# Patient Record
Sex: FEMALE | URBAN - NONMETROPOLITAN AREA
[De-identification: names, ages, dates, MRNs, and addresses within clinical notes are randomized per-mention and may not be internally consistent; named-entity substitution may affect disease eponyms.]

---

## 2018-03-13 ENCOUNTER — NURSE TRIAGE (OUTPATIENT)
Dept: ADMINISTRATIVE | Age: 73
End: 2018-03-13

## 2019-02-26 ENCOUNTER — NURSE TRIAGE (OUTPATIENT)
Dept: ADMINISTRATIVE | Age: 74
End: 2019-02-26

## 2019-06-03 ENCOUNTER — NURSE TRIAGE (OUTPATIENT)
Dept: OTHER | Facility: CLINIC | Age: 74
End: 2019-06-03

## 2019-06-03 NOTE — TELEPHONE ENCOUNTER
Reason for Disposition   Swelling of both ankles (i.e., pedal edema)   [1] Difficulty breathing with exertion (e.g., walking) AND [2] new onset or worsening    Protocols used: ANKLE SWELLING-ADULT-, LEG SWELLING AND EDEMA-ADULT-    Caller states she has swollen feet / ankles. States she had problems when she was younger with flying, her ankles would swell but would decrease within a day or so. She now has kidney problems - \"not emptying right. \"  She recently took a trip by car, was driving for 3.5 hours and her ankles are swollen, but they are not decreasing in size. She is watching her salt intake. She does state new onset of SOB with exertion, but not at rest.  Recommendation is to proceed to the [address] [place] [address] for further assessment. This triage is a result of a call to [facility]en Nurse. Please do not respond to the triage nurse through this encounter. Any subsequent communication should be directly with the patient.

## 2019-08-26 ENCOUNTER — NURSE TRIAGE (OUTPATIENT)
Dept: OTHER | Facility: CLINIC | Age: 74
End: 2019-08-26

## 2020-07-23 ENCOUNTER — NURSE TRIAGE (OUTPATIENT)
Dept: OTHER | Facility: CLINIC | Age: 75
End: 2020-07-23

## 2020-07-23 NOTE — TELEPHONE ENCOUNTER
This triage is a result of a call to [facility]en Nurse. Please do not respond to the triage nurse through this encounter. Any subsequent communication should be directly with the patient. She is fatigued, gets winded when she exerts herself. This has been going on for a couple of years, but seems to be getting worse. \"I feel like I'm a leaf in a rain storm\". She is speaking in long strong complete sentences. She has been seen for this in the past and all the tests were fine she said. She just feels exhausted. She says she does not sleep well. She states she does have panic at times and some depression. But she said this is not her normal.   She does not have pallor. She states food does not sit well with her at all times. She had a check up in March and is due in August again. She tries to keep busy.      Reason for Disposition   MODERATE weakness (i.e., interferes with work, school, normal activities) and persists > 3 days    Protocols used: WEAKNESS (GENERALIZED) AND FATIGUE-ADULT-[place]

## 2020-12-21 ENCOUNTER — NURSE TRIAGE (OUTPATIENT)
Dept: OTHER | Facility: CLINIC | Age: 75
End: 2020-12-21

## 2020-12-21 NOTE — TELEPHONE ENCOUNTER
Reason for Disposition   Age > 60 years    Answer Assessment - Initial Assessment Questions  1. LOCATION: \"Where does it hurt? \"       Upper abdominal pain     2. RADIATION: \"Does the pain shoot anywhere else? \" (e.g., chest, back)      No     3. ONSET: \"When did the pain begin? \" (e.g., minutes, hours or days ago)       Last night     4. SUDDEN: \"Gradual or sudden onset? \"     Felt fine all day, was nauseous and then pain, vomiting and diarrhea started     5. PATTERN \"Does the pain come and go, or is it constant? \"     - If constant: \"Is it getting better, staying the same, or worsening? \"       (Note: Constant means the pain never goes away completely; most serious pain is constant and it progresses)      - If intermittent: \"How long does it last?\" \"Do you have pain now? \"      (Note: Intermittent means the pain goes away completely between bouts)      intermittenet    6. SEVERITY: \"How bad is the pain? \"  (e.g., Scale 1-10; mild, moderate, or severe)    - MILD (1-3): doesn't interfere with normal activities, abdomen soft and not tender to touch     - MODERATE (4-7): interferes with normal activities or awakens from sleep, tender to touch     - SEVERE (8-10): excruciating pain, doubled over, unable to do any normal activities       3/10    7. RECURRENT SYMPTOM: \"Have you ever had this type of abdominal pain before? \" If so, ask: \"When was the last time? \" and \"What happened that time? \"       Last winter something similar 3 different times,     8. CAUSE: \"What do you think is causing the abdominal pain? \"      Unsure, states she doesn't think it is something she ate     9. RELIEVING/AGGRAVATING FACTORS: \"What makes it better or worse? \" (e.g., movement, antacids, bowel movement)  took 1 tums but hasnt really helped, nothing makes it better or worse     10. OTHER SYMPTOMS: \"Has there been any vomiting, diarrhea, constipation, or urine problems? \"        Vomiting, diarrhea,    11.  PREGNANCY: \"Is there any chance you are pregnant? \" \"When was your last menstrual period? \"        N/a    Protocols used: ABDOMINAL PAIN - FEMALE-ADULT-[place]        Brief description of triage: Pt reports upper abdominal pain that started last night, patient reports vomiting x 2, diarrhea x 1. Triage indicates for patient to be seen today or go to [facility]. Patient agrees. Care advice provided, patient verbalizes understanding; denies any other questions or concerns; instructed to call back for any new or worsening symptoms. Attention Provider: Thank you for allowing me to participate in the care of your patient. The patient was connected to triage in response to information provided to the [facility]. Please do not respond through this encounter as the response is not directed to a shared pool.

## 2021-07-19 ENCOUNTER — NURSE TRIAGE (OUTPATIENT)
Dept: OTHER | Facility: CLINIC | Age: 76
End: 2021-07-19

## 2021-07-19 NOTE — TELEPHONE ENCOUNTER
Brief description of triage:   Patient calling to say that she had called her provider about frequent urination. Has one more day of medication for her UTI. Has no new or worsening symptoms. Patient encouraged to follow providers advice. Care advice provided, patient verbalizes understanding; denies any other questions or concerns; instructed to call back for any new or worsening symptoms. This triage is a result of a call to [facility]en Nurse. Please do not respond to the triage nurse through this encounter. Any subsequent communication should be directly with the patient. Reason for Disposition   Caller has already spoken to PCP or another triager  Ez Alonso has already spoken with the PCP and has no further questions. Answer Assessment - Initial Assessment Questions  1. REASON FOR CALL or QUESTION: \"What is your reason for calling today? \" or \"How can I best help you? \" or \"What question do you have that I can help answer? \"      See above note    Protocols used: INFORMATION ONLY CALL - NO TRIAGE-ADULT-, NO CONTACT OR DUPLICATE CONTACT CALL-ADULT-

## 2021-12-12 ENCOUNTER — NURSE TRIAGE (OUTPATIENT)
Dept: OTHER | Facility: CLINIC | Age: 76
End: 2021-12-12

## 2021-12-13 NOTE — TELEPHONE ENCOUNTER
Caller started with a bout of diverticulitis or IBS about 3 weeks ago and it is getting worse. She is having abdominal pain, bloating diarrhea, nausea, vomiting and chills. She does not have a thermometer but feels warm. Patient feels weak as well. Denies any bloody stools or blood in vomit. Recommendation. See HCP within 4 hours or go to [facility]. Care advice provided, patient verbalizes understanding; denies any other questions or concerns; instructed to call back for any new or worsening symptoms. This triage is a result of a call to [facility]en Nurse. Please do not respond to the triager through this encounter. Any subsequent communication should be directly with patient. Reason for Disposition   [1] MILD-MODERATE pain AND [2] constant AND [3] present > 2 hours    Answer Assessment - Initial Assessment Questions  1. LOCATION: \"Where does it hurt? \"       Whole colon area     2. RADIATION: \"Does the pain shoot anywhere else? \" (e.g., chest, back)      Whole abdomen and colon area    3. ONSET: \"When did the pain begin? \" (e.g., minutes, hours or days ago)       A few weeks ago    4. SUDDEN: \"Gradual or sudden onset? \"      Gradually    5. PATTERN \"Does the pain come and go, or is it constant? \"     - If constant: \"Is it getting better, staying the same, or worsening? \"       (Note: Constant means the pain never goes away completely; most serious pain is constant and it progresses)      - If intermittent: \"How long does it last?\" \"Do you have pain now? \"      (Note: Intermittent means the pain goes away completely between bouts)      Comes and goes    6. SEVERITY: \"How bad is the pain? \"  (e.g., Scale 1-10; mild, moderate, or severe)    - MILD (1-3): doesn't interfere with normal activities, abdomen soft and not tender to touch     - MODERATE (4-7): interferes with normal activities or awakens from sleep, tender to touch     - SEVERE (8-10): excruciating pain, doubled over, unable to do any normal activities       4/10    7. RECURRENT SYMPTOM: \"Have you ever had this type of abdominal pain before? \" If so, ask: \"When was the last time? \" and \"What happened that time? \"       Yes, has IBS and diverticulitis    8. CAUSE: \"What do you think is causing the abdominal pain? \"      Diverticulitis or IBS    9. RELIEVING/AGGRAVATING FACTORS: \"What makes it better or worse? \" (e.g., movement, antacids, bowel movement)      No    10. OTHER SYMPTOMS: \"Has there been any vomiting, diarrhea, constipation, or urine problems? \"        Nausea, chills, diarrhea, vomiting    11. PREGNANCY: \"Is there any chance you are pregnant? \" \"When was your last menstrual period? \"        n/a    Protocols used: ABDOMINAL PAIN - [facility]

## 2021-12-21 ENCOUNTER — NURSE TRIAGE (OUTPATIENT)
Dept: OTHER | Facility: CLINIC | Age: 76
End: 2021-12-21

## 2021-12-21 NOTE — TELEPHONE ENCOUNTER
Reason for Disposition   Health Information question, no triage required and triager able to answer question    Protocols used: INFORMATION ONLY CALL - NO TRIAGE-ADULT-[place]    \"I had a CT the other day, do you think they would have told me if they saw colon cancer? \"    Writer explained that they most likely would have addressed that if they saw anything concerning. Writer recommended patient reach other to PCP for results.

## 2021-12-31 ENCOUNTER — NURSE TRIAGE (OUTPATIENT)
Dept: OTHER | Facility: CLINIC | Age: 76
End: 2021-12-31

## 2021-12-31 NOTE — TELEPHONE ENCOUNTER
Subjective: Caller states \"having diarrhea again\"     Current Symptoms: Had an episode of diarrhea today after dinner, was having diarrhea 2 weeks ago but stopped, nausea after eating    Onset: 10 minutes ago; sudden    Associated Symptoms: diarrhea    Pain Severity: 0/10; N/A; none    Temperature: No fevers    What has been tried: Stopped taking BP and cholesterol     LMP: No Pregnant: No    Recommended disposition: Home care    Care advice provided, patient verbalizes understanding; denies any other questions or concerns; instructed to call back for any new or worsening symptoms. Home care  This triage is a result of a call to [address] [place] [address] 151 Call a Nurse. Please do not respond to the triage nurse through this encounter. Any subsequent communication should be directly with the patient.       Reason for Disposition   MILD-MODERATE diarrhea (e.g., 1-6 times / day more than normal)    Protocols used: DIARRHEA-ADULT-

## 2022-07-17 ENCOUNTER — NURSE TRIAGE (OUTPATIENT)
Dept: OTHER | Facility: CLINIC | Age: 77
End: 2022-07-17

## 2022-07-17 NOTE — TELEPHONE ENCOUNTER
Subjective: Caller states \"yesterday started getting a cough. Today feel achy, warm to the touch. \"     Current Symptoms: cough, muscle aches, Warm to the touch    Onset: 1 day ago; worsening    Associated Symptoms: NA    Pain Severity: 5/10; aching; intermittent    Temperature: Unusre  Warm to touch    What has been tried: tylenol and Nyquil     LMP: NA Pregnant: NA    Recommended disposition: See PCP within 24 Hours    Care advice provided, patient verbalizes understanding; denies any other questions or concerns; instructed to call back for any new or worsening symptoms. Patient/caller agrees to follow-up with PCP   [facility]. Please do not respond to the triage nurse through this encounter. Any subsequent communication should be directly with the patient.         Reason for Disposition   [1] Continuous (nonstop) coughing interferes with work or school AND [2] no improvement using cough treatment per Care Advice    Protocols used: Cough - Acute Non-Productive-ADULT-

## 2022-07-18 ENCOUNTER — NURSE TRIAGE (OUTPATIENT)
Dept: OTHER | Facility: CLINIC | Age: 77
End: 2022-07-18

## 2022-07-18 NOTE — TELEPHONE ENCOUNTER
Subjective: Caller states \"poss covid\"     Current Symptoms:  + sx of covid, cough, headache, fever, decrease appetite   - sob, chest pain     Onset:    2 days     Pain Severity:   Headache - 8/10    Temperature:    + fever - unk how high; feels hot     What has been tried:   Vaccinated     Recommended disposition: See PCP within 24 Hours    Care advice provided, patient verbalizes understanding; denies any other questions or concerns; instructed to call back for any new or worsening symptoms. RN encouraged pt to call PCP office first thing in the morning     This triage is a result of a call to [facility] a Nurse. Please do not respond to the triage nurse through this encounter. Any subsequent communication should be directly with the patient.       Reason for Disposition   [1] COVID-19 infection suspected by caller or triager AND [2] mild symptoms (cough, fever, or others) AND [3] negative COVID-19 rapid test    Protocols used: Coron[facility] (COVID-19) [facility]

## 2022-09-22 ENCOUNTER — NURSE TRIAGE (OUTPATIENT)
Dept: OTHER | Facility: CLINIC | Age: 77
End: 2022-09-22

## 2022-09-23 NOTE — TELEPHONE ENCOUNTER
Subjective: Caller states \"just finished with abx this week. \"    Current Symptoms: Ear pain, and ears are plugged. States both hear are killing her. Has been going on the last few days. States she was on an oral abx. States she went to urgent care and was dx'd. Denies any current drainage. Denies any impaction that she was told of. But both ears are muffled and can barely hear. Onset: 3 days ago; gradual    Associated Symptoms: NA    Pain Severity: 8/10; sharp, aching; constant    Temperature: Denies    What has been tried: Abx finished, taken tylenol with no relief. LMP: NA Pregnant: NA    Recommended disposition: See HCP within 4 Hours (or PCP triage)    Care advice provided, patient verbalizes understanding; denies any other questions or concerns; instructed to call back for any new or worsening symptoms. Patient/caller agrees to proceed to nearest Emergency Department  [facility]. Please do not respond to the triage nurse through this encounter. Any subsequent communication should be directly with the patient.     Reason for Disposition   [1] SEVERE pain AND [2] not improved 2 hours after taking analgesic medication (e.g., ibuprofen or acetaminophen)    Protocols used: Earache-ADULT-

## 2022-10-03 ENCOUNTER — NURSE TRIAGE (OUTPATIENT)
Dept: OTHER | Facility: CLINIC | Age: 77
End: 2022-10-03

## 2022-10-04 NOTE — TELEPHONE ENCOUNTER
Subjective: Caller states \"I was having ear trouble and I went to [facility], my ears were so swollen and hurting so bad. They gave my antibiotics. It turns out they gave me the wrong thing because it was outter ear not inner ear. I called and they said I should go to ED. They gave me a vial of meds to put inside that did not work either. So I kita to my PCP and he gave me prescription for more eardrops and I have done that and my ear is still not right. It is like my ears are clogged. My left ear is a little better but my right ear is still bad. I had COVID really bad in August. Ever since then I have had problems with this, coughing and sinus. Current Symptoms: bilateral ear clogged and the right ear is worse. Dizziness that is on and off. Onset: 1 months ago; gradual    Associated Symptoms: NA    Pain Severity: 0/10; N/A; none    Temperature: patient denies by unknown method    What has been tried: antibiotics, ear drops    LMP: NA Pregnant: NA    Recommended disposition: See PCP within 3 Days    Care advice provided, patient verbalizes understanding; denies any other questions or concerns; instructed to call back for any new or worsening symptoms. Patient/caller agrees to follow-up with PCP     This triage is a result of a call to Fil[facility] steen Nurse. Please do not respond to the triager through this encounter. Any subsequent communication should be directly with patient.     Reason for Disposition   [1] Decreased hearing in 1 ear AND [2] gradual onset    Protocols used: Hearing Loss or Change-ADULT-

## 2022-10-27 ENCOUNTER — NURSE TRIAGE (OUTPATIENT)
Dept: OTHER | Facility: CLINIC | Age: 77
End: 2022-10-27

## 2022-10-27 NOTE — TELEPHONE ENCOUNTER
Location of patient: [place]    Subjective: Caller states \"Since Covid I haven't felt well at all. My problem is that I am so tired I can hardly walk across the floor. \"     Current Symptoms:   Fatigue  SOB upon exertion  Intermittent dizziness  Emotional  Patient had Covid-19 in August 2022. Fully vaccinated, but does not have any boosters. Negative covid-19 test today. Patient states she has been to PCP, [facility], and ER and is still not feeling well. She denies any worsening of sx. Last visit with PCP was 2 weeks ago and [facility] 1 week ago. Hx of recent UTI and ear infections    Onset: 2 months ago; unchanged    Pain Severity: 0/10    Temperature: Denies    Recommended disposition: See in Office Within 2 Weeks    Care advice provided, patient verbalizes understanding; denies any other questions or concerns; instructed to call back for any new or worsening symptoms. Patient/caller agrees to follow-up with PCP     This tri[facility]. Please do not respond to the triage nurse through this encounter. Any subsequent communication should be directly with the patient.       Reason for Disposition   [1] PERSISTING SYMPTOMS OF COVID-19 AND [2] symptoms SAME AND [3] medical visit for COVID-19 in past 2 weeks    Protocols used: Coronavirus (COVID-19) Persisting Symptoms Follow-up Call-ADULT-

## 2022-11-13 ENCOUNTER — NURSE TRIAGE (OUTPATIENT)
Dept: OTHER | Facility: CLINIC | Age: 77
End: 2022-11-13

## 2022-11-13 NOTE — TELEPHONE ENCOUNTER
Location of patient: [place]    Subjective: Caller states \"I had COVID in August. I haven't felt good since I had that. I wasn't feeling all that well before COVID but at least it was tolerable. I have a general weakness. There are times I can hardly walk across the floor. Some mornings when I get up my whole body is shaking. I go to the doctor and they tell me everything is fine. There has to be something going on, I don't know what it could be. I checked my blood pressure and temperature yesterday and everything was fine. I know I don't eat right. I live alone and sometimes I just don't feel like eating. \"    Current Symptoms: general weakness and fatigue    Onset: 4 months ago; gradual    Associated Symptoms:  nausea, lightheadedness    Pain Severity: 0/10    Temperature: 98.4 F     What has been tried: fatigue    LMP: NA Pregnant: No    Recommended disposition: See PCP within 3 Days    Care advice provided, patient verbalizes understanding; denies any other questions or concerns; instructed to call back for any new or worsening symptoms. Patient/caller agrees to follow-up with PCP   This triage is a result of a call to [facility] a Nurse. Please do not respond to the triage nurse through this encounter. Any subsequent communication should be directly with the patient.     Reason for Disposition   [1] MILD weakness (i.e., does not interfere with ability to work, go to school, normal activities) AND [2] persists > 1 week    Protocols used: Weakness (Generalized) and Fatigue-ADULT-

## 2023-08-23 ENCOUNTER — NURSE TRIAGE (OUTPATIENT)
Dept: OTHER | Facility: CLINIC | Age: 78
End: 2023-08-23

## 2023-08-23 NOTE — TELEPHONE ENCOUNTER
Location of patient: [place]    Subjective: Caller states \"I'm experiencing an unusual tiredness and I can't stay awake. My windows and doors are open. I'm always tired but this is different. I can't stay awake. 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 I feel like I've been doped. I haven't eaten today. Onset:  8/23/23    Pain Severity:   no pain    Temperature:  no fever    What has been tried: nothing so far    Recommended disposition: See PCP within 24 Hours    Care advice provided, patient verbalizes understanding; denies any other questions or concerns; instructed to call back for any new or worsening symptoms. 's office closed today. Asked pt to go to the [facility]. She stated she would do that. This triage is a result of a call to [facility] a Nurse. Please do not respond to the triage nurse through this encounter. Any subsequent communication should be directly with the patient.     Reason for Disposition   [1] MODERATE weakness (i.e., interferes with work, school, normal activities) AND [2] persists > 3 days    Protocols used: Weakness (Generalized) and Fatigue-ADULT-